# Patient Record
Sex: MALE | Race: WHITE | NOT HISPANIC OR LATINO | URBAN - METROPOLITAN AREA
[De-identification: names, ages, dates, MRNs, and addresses within clinical notes are randomized per-mention and may not be internally consistent; named-entity substitution may affect disease eponyms.]

---

## 2023-08-22 ENCOUNTER — HOSPITAL ENCOUNTER (OUTPATIENT)
Dept: DATA CONVERSION | Facility: HOSPITAL | Age: 48
Discharge: HOME | End: 2023-08-22

## 2023-08-22 DIAGNOSIS — R73.09 OTHER ABNORMAL GLUCOSE: ICD-10-CM

## 2023-08-22 DIAGNOSIS — Z00.00 ENCOUNTER FOR GENERAL ADULT MEDICAL EXAMINATION WITHOUT ABNORMAL FINDINGS: ICD-10-CM

## 2023-08-22 LAB
ALBUMIN SERPL-MCNC: 4.5 GM/DL (ref 3.5–5)
ALBUMIN/GLOB SERPL: 1.8 RATIO (ref 1.5–3)
ALP BLD-CCNC: 75 U/L (ref 35–125)
ALT SERPL-CCNC: 31 U/L (ref 5–40)
ANION GAP SERPL CALCULATED.3IONS-SCNC: 10 MMOL/L (ref 0–19)
APPEARANCE PLAS: NORMAL
AST SERPL-CCNC: 30 U/L (ref 5–40)
BACTERIA UR QL AUTO: NEGATIVE
BASOPHILS # BLD AUTO: 0.06 K/UL (ref 0–0.22)
BASOPHILS NFR BLD AUTO: 0.8 % (ref 0–1)
BILIRUB SERPL-MCNC: 0.6 MG/DL (ref 0.1–1.2)
BILIRUB UR QL STRIP.AUTO: NEGATIVE
BUN SERPL-MCNC: 15 MG/DL (ref 8–25)
BUN/CREAT SERPL: 13.6 RATIO (ref 8–21)
CALCIUM SERPL-MCNC: 9.4 MG/DL (ref 8.5–10.4)
CHLORIDE SERPL-SCNC: 103 MMOL/L (ref 97–107)
CHOLEST SERPL-MCNC: 198 MG/DL (ref 133–200)
CHOLEST/HDLC SERPL: 2.5 RATIO
CLARITY UR: CLEAR
CO2 SERPL-SCNC: 26 MMOL/L (ref 24–31)
COLOR SPUN FLD: NORMAL
COLOR UR: YELLOW
CREAT SERPL-MCNC: 1.1 MG/DL (ref 0.4–1.6)
DEPRECATED RDW RBC AUTO: 42.9 FL (ref 37–54)
DIFFERENTIAL METHOD BLD: ABNORMAL
EOSINOPHIL # BLD AUTO: 0.08 K/UL (ref 0–0.45)
EOSINOPHIL NFR BLD: 1 % (ref 0–3)
ERYTHROCYTE [DISTWIDTH] IN BLOOD BY AUTOMATED COUNT: 11.7 % (ref 11.7–15)
FASTING STATUS PATIENT QL REPORTED: NORMAL
GFR SERPL CREATININE-BSD FRML MDRD: 83 ML/MIN/1.73 M2
GLOBULIN SER-MCNC: 2.5 G/DL (ref 1.9–3.7)
GLUCOSE SERPL-MCNC: 105 MG/DL (ref 65–99)
GLUCOSE UR STRIP.AUTO-MCNC: NEGATIVE MG/DL
HBA1C MFR BLD: 5.5 % (ref 4–6)
HCT VFR BLD AUTO: 46 % (ref 41–50)
HDLC SERPL-MCNC: 80 MG/DL
HGB BLD-MCNC: 15.2 GM/DL (ref 13.5–16.5)
HGB UR QL STRIP.AUTO: ABNORMAL /HPF (ref 0–3)
HGB UR QL: NEGATIVE
HYALINE CASTS UR QL AUTO: 3 /LPF
IMM GRANULOCYTES # BLD AUTO: 0.02 K/UL (ref 0–0.1)
KETONES UR QL STRIP.AUTO: NEGATIVE
LDLC SERPL CALC-MCNC: 98 MG/DL (ref 65–130)
LEUKOCYTE ESTERASE UR QL STRIP.AUTO: NEGATIVE
LYMPHOCYTES # BLD AUTO: 1.89 K/UL (ref 1.2–3.2)
LYMPHOCYTES NFR BLD MANUAL: 24.3 % (ref 20–40)
MCH RBC QN AUTO: 33 PG (ref 26–34)
MCHC RBC AUTO-ENTMCNC: 33 % (ref 31–37)
MCV RBC AUTO: 100 FL (ref 80–100)
MONOCYTES # BLD AUTO: 0.7 K/UL (ref 0–0.8)
MONOCYTES NFR BLD MANUAL: 9 % (ref 0–8)
NEUTROPHILS # BLD AUTO: 5.03 K/UL
NEUTROPHILS # BLD AUTO: 5.03 K/UL (ref 1.8–7.7)
NEUTROPHILS.IMMATURE NFR BLD: 0.3 % (ref 0–1)
NEUTS SEG NFR BLD: 64.6 % (ref 50–70)
NITRITE UR QL STRIP.AUTO: NEGATIVE
NRBC BLD-RTO: 0 /100 WBC
PH UR STRIP.AUTO: 6.5 [PH] (ref 4.6–8)
PLATELET # BLD AUTO: 215 K/UL (ref 150–450)
PMV BLD AUTO: 12.3 CU (ref 7–12.6)
POTASSIUM SERPL-SCNC: 4.5 MMOL/L (ref 3.4–5.1)
PROT SERPL-MCNC: 7 G/DL (ref 5.9–7.9)
PROT UR STRIP.AUTO-MCNC: ABNORMAL MG/DL
RBC # BLD AUTO: 4.6 M/UL (ref 4.5–5.5)
REFLEX MICROSCOPIC (UA): ABNORMAL
SODIUM SERPL-SCNC: 139 MMOL/L (ref 133–145)
SP GR UR STRIP.AUTO: 1.02 (ref 1–1.03)
SQUAMOUS UR QL AUTO: ABNORMAL /HPF
TRIGL SERPL-MCNC: 100 MG/DL (ref 40–150)
UROBILINOGEN UR QL STRIP.AUTO: NORMAL MG/DL (ref 0–1)
WBC # BLD AUTO: 7.8 K/UL (ref 4.5–11)
WBC #/AREA URNS AUTO: 2 /HPF (ref 0–3)

## 2024-04-04 ENCOUNTER — TELEPHONE (OUTPATIENT)
Dept: PRIMARY CARE | Facility: CLINIC | Age: 49
End: 2024-04-04
Payer: COMMERCIAL

## 2024-04-04 ENCOUNTER — APPOINTMENT (OUTPATIENT)
Dept: RADIOLOGY | Facility: HOSPITAL | Age: 49
End: 2024-04-04
Payer: COMMERCIAL

## 2024-04-04 ENCOUNTER — HOSPITAL ENCOUNTER (EMERGENCY)
Facility: HOSPITAL | Age: 49
Discharge: HOME | End: 2024-04-04
Payer: COMMERCIAL

## 2024-04-04 ENCOUNTER — APPOINTMENT (OUTPATIENT)
Dept: CARDIOLOGY | Facility: HOSPITAL | Age: 49
End: 2024-04-04
Payer: COMMERCIAL

## 2024-04-04 VITALS
OXYGEN SATURATION: 97 % | DIASTOLIC BLOOD PRESSURE: 83 MMHG | WEIGHT: 246.03 LBS | TEMPERATURE: 97.7 F | BODY MASS INDEX: 30.59 KG/M2 | HEART RATE: 69 BPM | HEIGHT: 75 IN | SYSTOLIC BLOOD PRESSURE: 130 MMHG | RESPIRATION RATE: 17 BRPM

## 2024-04-04 DIAGNOSIS — R07.9 CHEST PAIN, UNSPECIFIED TYPE: Primary | ICD-10-CM

## 2024-04-04 LAB
ALBUMIN SERPL-MCNC: 4.4 G/DL (ref 3.5–5)
ALP BLD-CCNC: 82 U/L (ref 35–125)
ALT SERPL-CCNC: 36 U/L (ref 5–40)
ANION GAP SERPL CALC-SCNC: 10 MMOL/L
AST SERPL-CCNC: 24 U/L (ref 5–40)
BASOPHILS # BLD AUTO: 0.04 X10*3/UL (ref 0–0.1)
BASOPHILS NFR BLD AUTO: 0.4 %
BILIRUB SERPL-MCNC: 0.4 MG/DL (ref 0.1–1.2)
BUN SERPL-MCNC: 11 MG/DL (ref 8–25)
CALCIUM SERPL-MCNC: 9.3 MG/DL (ref 8.5–10.4)
CHLORIDE SERPL-SCNC: 103 MMOL/L (ref 97–107)
CO2 SERPL-SCNC: 28 MMOL/L (ref 24–31)
CREAT SERPL-MCNC: 1 MG/DL (ref 0.4–1.6)
EGFRCR SERPLBLD CKD-EPI 2021: >90 ML/MIN/1.73M*2
EOSINOPHIL # BLD AUTO: 0.08 X10*3/UL (ref 0–0.7)
EOSINOPHIL NFR BLD AUTO: 0.8 %
ERYTHROCYTE [DISTWIDTH] IN BLOOD BY AUTOMATED COUNT: 11.7 % (ref 11.5–14.5)
GLUCOSE SERPL-MCNC: 158 MG/DL (ref 65–99)
HCT VFR BLD AUTO: 42.1 % (ref 41–52)
HGB BLD-MCNC: 14.7 G/DL (ref 13.5–17.5)
IMM GRANULOCYTES # BLD AUTO: 0.02 X10*3/UL (ref 0–0.7)
IMM GRANULOCYTES NFR BLD AUTO: 0.2 % (ref 0–0.9)
LYMPHOCYTES # BLD AUTO: 2.26 X10*3/UL (ref 1.2–4.8)
LYMPHOCYTES NFR BLD AUTO: 22.2 %
MCH RBC QN AUTO: 33.9 PG (ref 26–34)
MCHC RBC AUTO-ENTMCNC: 34.9 G/DL (ref 32–36)
MCV RBC AUTO: 97 FL (ref 80–100)
MONOCYTES # BLD AUTO: 0.63 X10*3/UL (ref 0.1–1)
MONOCYTES NFR BLD AUTO: 6.2 %
NEUTROPHILS # BLD AUTO: 7.16 X10*3/UL (ref 1.2–7.7)
NEUTROPHILS NFR BLD AUTO: 70.2 %
NRBC BLD-RTO: ABNORMAL /100{WBCS}
PLATELET # BLD AUTO: 211 X10*3/UL (ref 150–450)
POTASSIUM SERPL-SCNC: 4.6 MMOL/L (ref 3.4–5.1)
PROT SERPL-MCNC: 6.9 G/DL (ref 5.9–7.9)
RBC # BLD AUTO: 4.33 X10*6/UL (ref 4.5–5.9)
SODIUM SERPL-SCNC: 141 MMOL/L (ref 133–145)
TROPONIN T SERPL-MCNC: 6 NG/L
TROPONIN T SERPL-MCNC: <6 NG/L
WBC # BLD AUTO: 10.2 X10*3/UL (ref 4.4–11.3)

## 2024-04-04 PROCEDURE — 2550000001 HC RX 255 CONTRASTS

## 2024-04-04 PROCEDURE — 36415 COLL VENOUS BLD VENIPUNCTURE: CPT | Performed by: EMERGENCY MEDICINE

## 2024-04-04 PROCEDURE — 71260 CT THORAX DX C+: CPT | Performed by: RADIOLOGY

## 2024-04-04 PROCEDURE — 71275 CT ANGIOGRAPHY CHEST: CPT

## 2024-04-04 PROCEDURE — 80053 COMPREHEN METABOLIC PANEL: CPT | Performed by: EMERGENCY MEDICINE

## 2024-04-04 PROCEDURE — 85025 COMPLETE CBC W/AUTO DIFF WBC: CPT | Performed by: EMERGENCY MEDICINE

## 2024-04-04 PROCEDURE — 93005 ELECTROCARDIOGRAM TRACING: CPT

## 2024-04-04 PROCEDURE — 99285 EMERGENCY DEPT VISIT HI MDM: CPT | Mod: 25

## 2024-04-04 PROCEDURE — 84484 ASSAY OF TROPONIN QUANT: CPT | Performed by: EMERGENCY MEDICINE

## 2024-04-04 RX ORDER — IBUPROFEN 800 MG/1
800 TABLET ORAL 3 TIMES DAILY
Qty: 21 TABLET | Refills: 0 | Status: SHIPPED | OUTPATIENT
Start: 2024-04-04 | End: 2024-04-11

## 2024-04-04 RX ORDER — ASPIRIN 81 MG/1
81 TABLET ORAL DAILY
COMMUNITY

## 2024-04-04 RX ADMIN — IOHEXOL 75 ML: 350 INJECTION, SOLUTION INTRAVENOUS at 16:27

## 2024-04-04 ASSESSMENT — PAIN DESCRIPTION - ONSET: ONSET: SUDDEN

## 2024-04-04 ASSESSMENT — PAIN DESCRIPTION - DESCRIPTORS
DESCRIPTORS: DULL
DESCRIPTORS: DULL

## 2024-04-04 ASSESSMENT — PAIN SCALES - GENERAL
PAINLEVEL_OUTOF10: 2
PAINLEVEL_OUTOF10: 3

## 2024-04-04 ASSESSMENT — PAIN DESCRIPTION - LOCATION: LOCATION: CHEST

## 2024-04-04 ASSESSMENT — PAIN DESCRIPTION - FREQUENCY: FREQUENCY: CONSTANT/CONTINUOUS

## 2024-04-04 ASSESSMENT — COLUMBIA-SUICIDE SEVERITY RATING SCALE - C-SSRS
1. IN THE PAST MONTH, HAVE YOU WISHED YOU WERE DEAD OR WISHED YOU COULD GO TO SLEEP AND NOT WAKE UP?: NO
6. HAVE YOU EVER DONE ANYTHING, STARTED TO DO ANYTHING, OR PREPARED TO DO ANYTHING TO END YOUR LIFE?: NO
2. HAVE YOU ACTUALLY HAD ANY THOUGHTS OF KILLING YOURSELF?: NO

## 2024-04-04 ASSESSMENT — PAIN DESCRIPTION - PAIN TYPE: TYPE: ACUTE PAIN

## 2024-04-04 ASSESSMENT — PAIN - FUNCTIONAL ASSESSMENT: PAIN_FUNCTIONAL_ASSESSMENT: 0-10

## 2024-04-04 ASSESSMENT — PAIN DESCRIPTION - ORIENTATION: ORIENTATION: LEFT;MID

## 2024-04-04 ASSESSMENT — PAIN DESCRIPTION - PROGRESSION: CLINICAL_PROGRESSION: NOT CHANGED

## 2024-04-04 NOTE — TELEPHONE ENCOUNTER
Patient called  [phone] said for past couple days  he has had dull pain left side of chest has today and right thumb numbness  for couple weeks no other SX could not be triaged.  Patient had hung up 2 x  , called back left message

## 2024-04-04 NOTE — ED PROVIDER NOTES
HPI   Chief Complaint   Patient presents with    Chest Pain     For the past 2 days I have had dull pain in my lt chest close to mid sternal I have had a aortic dissection which is slow in 2019 it was 4.2 cm no pain in my back       HPI  Patient is a 48-year-old male with history of thoracic aortic aneurysm who presents to ED for dull achy left-sided chest pain x 2 days.  Patient states he contacted primary care provider who advised him to come to ED for evaluation and obtain CT scan for reevaluation of of aortic aneurysm as well as ACS.  Patient denies any shortness of breath, abdominal pain, NVD, urinary symptoms, fever or chills, headache or dizziness.  Patient is well-appearing, he states he feels fine other than this dull ache on the left side of his chest which he thinks may be due to swinging a golf club recently.  Patient has no other acute complaints today.                  [place] Coma Scale Score: 15                     Patient History   No past medical history on file.  No past surgical history on file.  No family history on file.  Social History     Tobacco Use    Smoking status: Not on file    Smokeless tobacco: Not on file   Substance Use Topics    Alcohol use: Not on file    Drug use: Not on file       Physical Exam   ED Triage Vitals [04/04/24 1457]   Temperature Heart Rate Respirations BP   36.5 °C (97.7 °F) 86 18 153/59      Pulse Ox Temp Source Heart Rate Source Patient Position   99 % Oral Monitor Sitting      BP Location FiO2 (%)     Right arm --       Physical Exam  Vitals reviewed.   Constitutional:       Appearance: He is well-developed.   HENT:      Head: Normocephalic and atraumatic.   Eyes:      Extraocular Movements: Extraocular movements intact.   Cardiovascular:      Rate and Rhythm: Normal rate and regular rhythm.   Pulmonary:      Effort: Pulmonary effort is normal.      Breath sounds: Normal breath sounds.   Abdominal:      General: Abdomen is flat.      Palpations: Abdomen is soft.       Tenderness: There is no abdominal tenderness.   Musculoskeletal:         General: Normal range of motion.      Cervical back: Normal range of motion and neck supple.   Skin:     General: Skin is warm and dry.   Neurological:      General: No focal deficit present.      Mental Status: He is alert and oriented to person, place, and time.   Psychiatric:         Mood and Affect: Mood normal.         Behavior: Behavior normal.         ED Course & MDM   ED Course as of 04/04/24 1907   Thu Apr 04, 2024   1550 Normal sinus rhythm with a rate of 85.  No evidence of ischemia no previous EKG for comparison [JN]      ED Course User Index  [JN] William Short MD         Diagnoses as of 04/04/24 1907   Chest pain, unspecified type       Medical Decision Making  Parts of this chart have been completed using voice recognition software. Please excuse any errors of transcription.  My thought process and reason for plan has been formulated from the time that I saw the patient until the time of disposition and is not specific to one specific moment during their visit and furthermore my MDM encompasses this entire chart and not only this text box.    HPI:   Detailed above.    Exam:   A medically appropriate exam performed, outlined above, given the known history and presentation.    History obtained from:   Patient, EMR    EKG:   Interpreted by attending physician, see their note for ED course for more detail.    Social Determinants of Health considered during this visit:   Employment status, Family or social support .    Medications given during visit:  Medications   iohexol (OMNIPaque) 350 mg iodine/mL solution 75 mL (75 mL intravenous Given 4/4/24 1627)        Diagnostic/tests:  Labs Reviewed   CBC WITH AUTO DIFFERENTIAL - Abnormal       Result Value    WBC 10.2      nRBC        RBC 4.33 (*)     Hemoglobin 14.7      Hematocrit 42.1      MCV 97      MCH 33.9      MCHC 34.9      RDW 11.7      Platelets 211       Neutrophils % 70.2      Immature Granulocytes %, Automated 0.2      Lymphocytes % 22.2      Monocytes % 6.2      Eosinophils % 0.8      Basophils % 0.4      Neutrophils Absolute 7.16      Immature Granulocytes Absolute, Automated 0.02      Lymphocytes Absolute 2.26      Monocytes Absolute 0.63      Eosinophils Absolute 0.08      Basophils Absolute 0.04     COMPREHENSIVE METABOLIC PANEL - Abnormal    Glucose 158 (*)     Sodium 141      Potassium 4.6      Chloride 103      Bicarbonate 28      Urea Nitrogen 11      Creatinine 1.00      eGFR >90      Calcium 9.3      Albumin 4.4      Alkaline Phosphatase 82      Total Protein 6.9      AST 24      Bilirubin, Total 0.4      ALT 36      Anion Gap 10     SERIAL TROPONIN, INITIAL ([facility]) - Normal    Troponin T, High Sensitivity 6     SERIAL TROPONIN,  2 HOUR ([facility]) - Normal    Troponin T, High Sensitivity <6     TROPONIN T SERIES, HIGH SENSITIVITY (0, 2 HR, 6 HR)    Narrative:     The following orders were created for panel order Troponin T Series, High Sensitivity (0, 2HR, 6HR).  Procedure                               Abnormality         Status                     ---------                               -----------         ------                     Serial Troponin, Initial...[[id number]]  Normal              Final result               Serial Troponin, 2 Hour ...[[id number]]  Normal              Final result               Serial Troponin, 6 Hour ...[[id number]]                                                   Please view results for these tests on the individual orders.   SERIAL TROPONIN, 6 HOUR ([facility])      CT angio chest w and wo IV contrast   Final Result   Ectasia of ascending thoracic aorta to 4 cm, stable compared to   October 2021.        MACRO:   None        Signed by: Delonte Holley 4/4/2024 5:27 PM   Dictation workstation:   [id number]           Differential Diagnosis:   As I have deemed necessary from their history, physical, and studies, I have considered the  following diagnoses:     ACUTE MYOCARDIAL INFARCTION  PULMONARY EMBOLISM  AORTIC DISSECTION  PERICARDITIS  PNEUMOTHORAX  PNEUMONIA  COSTOCHONDRITIS OR MSK PAIN  PERFORATED PEPTIC ULCER  ESOPHAGEAL RUPTURE  GERD  CHOLECYSTITIS    HEART Score <=3 and 1 negative troponin - No hospitalization indicated  I completed a HEART Score to screen for Major Adverse Cardiac Event (MACE) in this patient. The evidence indicates that the patient is low risk for MACE and this is consistent with my clinical intuition. The risk of further workup or hospitalization for MACE is likely higher than the risk of the patient having a MACE. It is,  therefore, in the patient´s best interest not to do additional emergent testing or to be hospitalized for MACE. I have discussed with the patient my clinical impression and the result of the HEART Score to screen for MACE, as well as the risks of further testing and hospitalization. The HEART Score shows that the risk for MACE is less than 2%    Consultations:      Procedures:      Critical Care:      Referrals:      Discharge Prescriptions:  Ibuprofen    MDM Summary:  Chest pain is likely musculoskeletal.  Workup is unremarkable.  Troponins are within normal limits.  Vital signs are all within normal limits and stable.  Patient is very well-appearing and pleasant in conversation.  CT of chest shows aneurysm is stable and has not grown in size since prior CT 2 years ago.  Patient is agreeable to discharge home at this time with appropriate follow-up with primary provider.  We have discussed the diagnosis and risks, and we agree with discharging home to follow-up with appropriate physician as directed. We also discussed returning to the Emergency Department immediately if new or worsening symptoms occur. We have discussed the symptoms which are most concerning that necessitate immediate return. Pt symptoms have been well controlled here and the patient is safe for discharge with appropriate  outpatient follow up. The patient has verbalized understanding to return to ER without delay for new or worsening pains or for any other symptoms or concerns.    I utilized the discharge clinical management tool provided [employer] to help estimate risk of negative outcome for this patient.        Procedure  Procedures     Heber Manley PA-C  04/04/24 1916

## 2024-04-04 NOTE — PROGRESS NOTES
Pharmacy Medication History Review    Freddy Olson is a 48 y.o. male admitted for chest pain. Pharmacy reviewed the patient's [id number] medications and allergies for accuracy.    The list below reflectives the updated PTA list. Please review each medication in order reconciliation for additional clarification and justification.  Prior to Admission Medications   Prescriptions Last Dose Informant Patient Reported? Taking?   aspirin 81 mg EC tablet More than a month  Yes No   Sig: Take 1 tablet (81 mg) by mouth once daily.      Facility-Administered Medications: None          The list below reflectives the updated allergy list. Please review each documented allergy for additional clarification and justification.  Allergies  Reviewed by Cyn Kwon CPhT on 4/4/2024   No Known Allergies         Below are additional concerns with the patient's PTA list.  - pt stated he takes aspirin 81 mg when he knows he will be seated for a while, such as af light, long drive, said he needs to be better about taking it.     CYN KWON CPhT

## 2024-06-19 LAB
ATRIAL RATE: 85 BPM
P AXIS: 72 DEGREES
P OFFSET: 204 MS
P ONSET: 150 MS
PR INTERVAL: 138 MS
Q ONSET: 219 MS
QRS COUNT: 14 BEATS
QRS DURATION: 88 MS
QT INTERVAL: 356 MS
QTC CALCULATION(BAZETT): 423 MS
QTC FREDERICIA: 399 MS
R AXIS: 74 DEGREES
T AXIS: 71 DEGREES
T OFFSET: 397 MS
VENTRICULAR RATE: 85 BPM

## 2024-07-26 ENCOUNTER — TELEPHONE (OUTPATIENT)
Dept: PRIMARY CARE | Facility: CLINIC | Age: 49
End: 2024-07-26
Payer: COMMERCIAL

## 2024-07-26 NOTE — TELEPHONE ENCOUNTER
Patient called in [phone]  Has had some bloating and tenderness and soreness on his lower left abdomin for about 3 weeks. He thought he pulled a muscle while golfing, but it hasn't gotten better. He is looking for advice.

## 2024-08-05 ENCOUNTER — APPOINTMENT (OUTPATIENT)
Dept: PRIMARY CARE | Facility: CLINIC | Age: 49
End: 2024-08-05
Payer: COMMERCIAL

## 2024-08-05 VITALS
RESPIRATION RATE: 18 BRPM | SYSTOLIC BLOOD PRESSURE: 130 MMHG | BODY MASS INDEX: 29.62 KG/M2 | OXYGEN SATURATION: 97 % | DIASTOLIC BLOOD PRESSURE: 82 MMHG | TEMPERATURE: 98.3 F | WEIGHT: 237 LBS | HEART RATE: 69 BPM

## 2024-08-05 DIAGNOSIS — R10.32 LEFT LOWER QUADRANT ABDOMINAL PAIN: ICD-10-CM

## 2024-08-05 DIAGNOSIS — M94.0 COSTOCHONDRITIS: Primary | ICD-10-CM

## 2024-08-05 LAB
POC APPEARANCE, URINE: CLEAR
POC BILIRUBIN, URINE: NEGATIVE
POC BLOOD, URINE: NEGATIVE
POC COLOR, URINE: YELLOW
POC GLUCOSE, URINE: NEGATIVE MG/DL
POC KETONES, URINE: NEGATIVE MG/DL
POC LEUKOCYTES, URINE: NEGATIVE
POC NITRITE,URINE: NEGATIVE
POC PH, URINE: 6 PH
POC PROTEIN, URINE: NEGATIVE MG/DL
POC SPECIFIC GRAVITY, URINE: >=1.03
POC UROBILINOGEN, URINE: 0.2 EU/DL

## 2024-08-05 PROCEDURE — 81003 URINALYSIS AUTO W/O SCOPE: CPT | Performed by: FAMILY MEDICINE

## 2024-08-05 PROCEDURE — 99213 OFFICE O/P EST LOW 20 MIN: CPT | Performed by: FAMILY MEDICINE

## 2024-08-05 PROCEDURE — 4004F PT TOBACCO SCREEN RCVD TLK: CPT | Performed by: FAMILY MEDICINE

## 2024-08-05 ASSESSMENT — PAIN SCALES - GENERAL: PAINLEVEL: 1

## 2024-08-05 NOTE — ASSESSMENT & PLAN NOTE
I suspect he strained his rib or Xiang rib cartilage.  Recommended ibuprofen 6 or milligrams twice daily for 10 days scheduled.  Heat 20 as per application.  Light stretching exercises.  I did tell him that if he had Recurrence of pain when active such as swinging a golf club may prolong his symptoms.  If overall the pain is becoming worse or he has other symptoms associated with that he should return to see me.

## 2024-08-05 NOTE — PROGRESS NOTES
Subjective   Patient ID: Asael Olson is a 48 y.o. male who presents for Abdominal Pain (Patient is here for left abd pain on the side x 1 month ).    HPI   He is here for left flank pain.  Started about a month ago.  He first noticed it when he was straining to have a bowel movement.  Ever since then he has intermittent pain when he moves in a certain position.  He is noticed it several times when he swings a golf club but not every time he is on the golf club.  He has taken ibuprofen on a couple occasions.  Most time it is not too painful.  He has had no significant change in bowel movements and no nausea and no shortness of breath.  Review of Systems  Denies headache, blurred vision, chest pain, shortness of breath, nausea or vomiting, change in bowel habits or leg pain or swelling.    Objective   /82 (BP Location: Left arm, Patient Position: Sitting, BP Cuff Size: Large adult)   Pulse 69   Temp 36.8 °C (98.3 °F)   Resp 18   Wt 108 kg (237 lb)   SpO2 97%   BMI 29.62 kg/m²     Physical Exam  Vitals and nurse's notes reviewed  General: no acute distress  HEENT: Normal  Neck: Supple  Lungs: Clear  Cardio: RRR w/o murmur  Abdomen: Soft, nontender, no hepatosplenomegaly. There is pinpoint tenderness along the lower rib margin on the left lateral aspect.  There is no bruising here.  The pain was reproduced when I pushed in this area.  Extremities: No edema, no calf tenderness  Neuro: Alert and oriented with no focal deficits    Assessment/Plan   Problem List Items Addressed This Visit             ICD-10-CM       Medium    Costochondritis - Primary M94.0     I suspect he strained his rib or Xiang rib cartilage.  Recommended ibuprofen 6 or milligrams twice daily for 10 days scheduled.  Heat 20 as per application.  Light stretching exercises.  I did tell him that if he had Recurrence of pain when active such as swinging a golf club may prolong his symptoms.  If overall the pain is becoming worse or he has other  symptoms associated with that he should return to see me.          Other Visit Diagnoses         Codes    Left lower quadrant abdominal pain     R10.32    Relevant Orders    POCT UA Automated manually resulted (Completed)

## 2024-08-17 ENCOUNTER — HOSPITAL ENCOUNTER (EMERGENCY)
Facility: HOSPITAL | Age: 49
Discharge: OTHER INSTITUTION | End: 2024-08-18
Attending: EMERGENCY MEDICINE
Payer: COMMERCIAL

## 2024-08-17 VITALS
SYSTOLIC BLOOD PRESSURE: 142 MMHG | WEIGHT: 235 LBS | DIASTOLIC BLOOD PRESSURE: 91 MMHG | BODY MASS INDEX: 29.22 KG/M2 | HEIGHT: 75 IN | RESPIRATION RATE: 16 BRPM | OXYGEN SATURATION: 97 % | TEMPERATURE: 98.6 F | HEART RATE: 67 BPM

## 2024-08-17 DIAGNOSIS — B00.50: Primary | ICD-10-CM

## 2024-08-17 DIAGNOSIS — B00.1: Primary | ICD-10-CM

## 2024-08-17 PROCEDURE — 99283 EMERGENCY DEPT VISIT LOW MDM: CPT

## 2024-08-17 PROCEDURE — 2500000001 HC RX 250 WO HCPCS SELF ADMINISTERED DRUGS (ALT 637 FOR MEDICARE OP): Performed by: EMERGENCY MEDICINE

## 2024-08-17 RX ORDER — VALACYCLOVIR HYDROCHLORIDE 500 MG/1
1000 TABLET, FILM COATED ORAL ONCE
Status: COMPLETED | OUTPATIENT
Start: 2024-08-18 | End: 2024-08-17

## 2024-08-17 RX ORDER — TETRACAINE HYDROCHLORIDE 5 MG/ML
1 SOLUTION OPHTHALMIC ONCE
Status: COMPLETED | OUTPATIENT
Start: 2024-08-17 | End: 2024-08-17

## 2024-08-17 RX ADMIN — VALACYCLOVIR HYDROCHLORIDE 1000 MG: 500 TABLET, FILM COATED ORAL at 23:58

## 2024-08-17 RX ADMIN — FLUORESCEIN SODIUM 1 STRIP: 1 STRIP OPHTHALMIC at 23:30

## 2024-08-17 RX ADMIN — TETRACAINE HYDROCHLORIDE 1 DROP: 5 SOLUTION OPHTHALMIC at 23:30

## 2024-08-17 ASSESSMENT — COLUMBIA-SUICIDE SEVERITY RATING SCALE - C-SSRS
2. HAVE YOU ACTUALLY HAD ANY THOUGHTS OF KILLING YOURSELF?: NO
1. IN THE PAST MONTH, HAVE YOU WISHED YOU WERE DEAD OR WISHED YOU COULD GO TO SLEEP AND NOT WAKE UP?: NO
6. HAVE YOU EVER DONE ANYTHING, STARTED TO DO ANYTHING, OR PREPARED TO DO ANYTHING TO END YOUR LIFE?: NO

## 2024-08-17 ASSESSMENT — PAIN - FUNCTIONAL ASSESSMENT: PAIN_FUNCTIONAL_ASSESSMENT: 0-10

## 2024-08-17 ASSESSMENT — PAIN SCALES - GENERAL: PAINLEVEL_OUTOF10: 2

## 2024-08-18 VITALS
BODY MASS INDEX: 29.33 KG/M2 | DIASTOLIC BLOOD PRESSURE: 94 MMHG | RESPIRATION RATE: 16 BRPM | WEIGHT: 235.89 LBS | OXYGEN SATURATION: 99 % | HEART RATE: 61 BPM | TEMPERATURE: 96.6 F | HEIGHT: 75 IN | SYSTOLIC BLOOD PRESSURE: 157 MMHG

## 2024-08-18 DIAGNOSIS — B02.30 HERPES ZOSTER OPHTHALMICUS OF RIGHT EYE: Primary | ICD-10-CM

## 2024-08-18 PROCEDURE — 2500000002 HC RX 250 W HCPCS SELF ADMINISTERED DRUGS (ALT 637 FOR MEDICARE OP, ALT 636 FOR OP/ED): Performed by: EMERGENCY MEDICINE

## 2024-08-18 PROCEDURE — 99284 EMERGENCY DEPT VISIT MOD MDM: CPT | Performed by: PHYSICIAN ASSISTANT

## 2024-08-18 PROCEDURE — 99283 EMERGENCY DEPT VISIT LOW MDM: CPT

## 2024-08-18 PROCEDURE — 2500000001 HC RX 250 WO HCPCS SELF ADMINISTERED DRUGS (ALT 637 FOR MEDICARE OP): Performed by: PHYSICIAN ASSISTANT

## 2024-08-18 PROCEDURE — 99284 EMERGENCY DEPT VISIT MOD MDM: CPT

## 2024-08-18 RX ORDER — VALACYCLOVIR HYDROCHLORIDE 1 G/1
1000 TABLET, FILM COATED ORAL 3 TIMES DAILY
Qty: 30 TABLET | Refills: 0 | Status: SHIPPED | OUTPATIENT
Start: 2024-08-18 | End: 2024-08-28

## 2024-08-18 RX ORDER — OXYCODONE AND ACETAMINOPHEN 5; 325 MG/1; MG/1
1 TABLET ORAL ONCE
Status: COMPLETED | OUTPATIENT
Start: 2024-08-18 | End: 2024-08-18

## 2024-08-18 RX ORDER — MOXIFLOXACIN 5 MG/ML
1 SOLUTION/ DROPS OPHTHALMIC 3 TIMES DAILY
Qty: 3 ML | Refills: 0 | Status: SHIPPED | OUTPATIENT
Start: 2024-08-18 | End: 2024-08-25

## 2024-08-18 RX ORDER — ERYTHROMYCIN 5 MG/G
OINTMENT OPHTHALMIC NIGHTLY
Qty: 3.5 G | Refills: 0 | Status: SHIPPED | OUTPATIENT
Start: 2024-08-18 | End: 2024-08-28

## 2024-08-18 RX ORDER — KETOROLAC TROMETHAMINE 30 MG/ML
30 INJECTION, SOLUTION INTRAMUSCULAR; INTRAVENOUS ONCE
Status: DISCONTINUED | OUTPATIENT
Start: 2024-08-18 | End: 2024-08-18

## 2024-08-18 RX ORDER — OXYCODONE AND ACETAMINOPHEN 5; 325 MG/1; MG/1
1 TABLET ORAL ONCE
Status: DISCONTINUED | OUTPATIENT
Start: 2024-08-18 | End: 2024-08-18

## 2024-08-18 ASSESSMENT — LIFESTYLE VARIABLES
TOTAL SCORE: 0
EVER HAD A DRINK FIRST THING IN THE MORNING TO STEADY YOUR NERVES TO GET RID OF A HANGOVER: NO
EVER FELT BAD OR GUILTY ABOUT YOUR DRINKING: NO
HAVE PEOPLE ANNOYED YOU BY CRITICIZING YOUR DRINKING: NO
HAVE YOU EVER FELT YOU SHOULD CUT DOWN ON YOUR DRINKING: NO

## 2024-08-18 ASSESSMENT — PAIN SCALES - GENERAL: PAINLEVEL_OUTOF10: 3

## 2024-08-18 ASSESSMENT — COLUMBIA-SUICIDE SEVERITY RATING SCALE - C-SSRS
6. HAVE YOU EVER DONE ANYTHING, STARTED TO DO ANYTHING, OR PREPARED TO DO ANYTHING TO END YOUR LIFE?: NO
1. IN THE PAST MONTH, HAVE YOU WISHED YOU WERE DEAD OR WISHED YOU COULD GO TO SLEEP AND NOT WAKE UP?: NO
2. HAVE YOU ACTUALLY HAD ANY THOUGHTS OF KILLING YOURSELF?: NO

## 2024-08-18 ASSESSMENT — PAIN DESCRIPTION - LOCATION: LOCATION: EYE

## 2024-08-18 NOTE — ED TRIAGE NOTES
Pt coming in for optho consult sent from [facility] pt eye is red and swollen there was a concern for shingles to right eye pt able to see out his right eye but it is blurry

## 2024-08-18 NOTE — ED PROVIDER NOTES
HPI   Chief Complaint   Patient presents with    Eye Problem       HPI: Patient is a 48-year-old male who presents to the ED for concern of herpes shingles ophthalmicus.  Patient initially seen at [facility] for complaint of right eye pain.  States that it started off with his right eye feeling itchy and irritated.  States that since then he started noticing lesions on his right forehead and right temporal area.  States that the eye has been progressively more painful.  He was sent to the ED for consult ophthalmology consult and concern of involvement of the eye.  Patient currently states that his pain is rated a 3 out of 10 in severity and is now starting to radiate to his right ear. He denies any blurred vision or vision loss.  Denies any photophobia.  He is a contact lens wearer.  ------------------------------------------------------------------------------------------------------------------------------------------  ROS: a ten point review of systems was performed and was negative except as per HPI.  ------------------------------------------------------------------------------------------------------------------------------------------  PMH / PSH: as per HPI, otherwise reviewed   MEDS: as per HPI, otherwise reviewed in EMR  ALLERGIES: as per HPI, otherwise reviewed in EMR  SocH:  as per HPI, otherwise reviewed in EMR  FH:  as per HPI, otherwise reviewed in EMR   ------------------------------------------------------------------------------------------------------------------------------------------  Physical Exam:  VS: As documented in the triage note and EMR flowsheet from this visit was reviewed  General: Well appearing. No acute distress.   Eyes:  Extraocular movements grossly intact. No scleral icterus. Injection of the right eye.   Head: Atraumatic. Normocephalic.     Neck: No meningismus. No gross masses. Full movement through range of motion  ENT: Posterior oropharynx shows no erythema,  exudate or edema.  Uvula is midline without edema.  No stridor or trismus  CV: Regular rhythm. No murmurs, rubs, gallops appreciated.   Resp: Clear to auscultation bilaterally. No respiratory distress.    GI: Nontender. Soft. No masses. No rebound, rigidity or guarding.   MSK: Symmetric muscle bulk. No gross step offs or deformities.  Skin: Warm, dry. Vesicular rash noted to the V1 trigeminal nerve distribution.   Neuro: CN II-VII intact. A&O x3. Speech fluent. Alert. Moving all extremities. Ambulates with normal gait  Psych: Appropriate mood and affect for situation  ------------------------------------------------------------------------------------------------------------------------------------------  Hospital Course / Medical Decision Making: Patient is a 48-year-old male who presents to the ED from a [facility] for concern of herpes shingles ophthalmicus.  Patient notes right eye irritation and itchiness followed by the vesicular rash that spread throughout his forehead and face.  On examination, has injection of the right eye with a vesicular rash noted to the V1 trigeminal nerve distribution concerning for herpes zoster ophthalmicus.  Ophthalmology consulted.  Pending their evaluation and final recommendations at the end of my shift.  Patient be signed out to the oncoming provider.  Please see their note for final results and disposition of the patient.                  Patient History   Past Medical History:   Diagnosis Date    Migraine      No past surgical history on file.  No family history on file.  Social History     Tobacco Use    Smoking status: Every Day     Current packs/day: 1.00     Types: Cigarettes     Passive exposure: Current    Smokeless tobacco: Never   Substance Use Topics    Alcohol use: Yes     Alcohol/week: 22.0 standard drinks of alcohol     Types: 22 Standard drinks or equivalent per week    Drug use: Never       Physical Exam   ED Triage Vitals [08/18/24 0110]    Temperature Heart Rate Respirations BP   35.9 °C (96.6 °F) 61 16 (!) 157/94      Pulse Ox Temp Source Heart Rate Source Patient Position   99 % Temporal -- --      BP Location FiO2 (%)     -- --       Physical Exam      ED Course & MDM   Diagnoses as of 10/23/24 2310   Herpes zoster ophthalmicus of right eye                 No data recorded     [place] Coma Scale Score: 15 (08/18/24 0122 : Verito Perez RN)                           Medical Decision Making      Procedure  Procedures     Stephanie Pascual PA-C  08/18/24 0259      This patient was seen by the [facility].  I have personally seen and examined the patient. I made / approved the management plan and take responsibility for patient management. I reviewed and edited the above documentation where necessary.     Patient presenting as transfer for ophthalmology evaluation.  They were contacted and evaluated the patient in the emergency department.    Huong Salazar DO  Emergency Medicine       Huong Salazar DO  10/23/24 4312

## 2024-08-18 NOTE — ED PROVIDER NOTES
"EMERGENCY DEPARTMENT ENCOUNTER      Pt Name: Freddy Olson \"Lissa"  MRN: [id number]  Birthdate 1975  Date of evaluation: 8/17/2024  ED Provider: Brynn Shepard DO     CHIEF COMPLAINT       Chief Complaint   Patient presents with    Eye Problem     Pt presents to the ED with c/c of RIGHT eye issues. Pt states this started on Tuesday and has been worsening. Pts eye is red and swollen. Pt states that it is painful and now he also has red bumps on the right side of his face and a dull pain in the right side of his face and head.        HISTORY OF PRESENT ILLNESS    Freddy Olson \"Lissa" is a 48 y.o. who presents to the emergency department with complaints of right eye pain.  This started on Tuesday and he notices that the eye has become progressively more painful and feels swollen.  Earlier today he started noticing lesions on his right forehead and right temporal area.  He presents now for evaluation.  He has never had anything this previously.  He states that these lesions feel tingling and itchy.  He denies any hearing changes or visual changes.  He has been wearing his glasses instead of his contacts.  No other acute complaints.     REVIEW OF SYSTEMS     Focused ROS performed and negative other than as listed in HPI    PAST MEDICAL HISTORY   No past medical history on file.    SURGICAL HISTORY     No past surgical history on file.    CURRENT MEDICATIONS       Discharge Medication List as of 8/18/2024 12:17 AM        CONTINUE these medications which have NOT CHANGED    Details   aspirin 81 mg EC tablet Take 1 tablet (81 mg) by mouth once daily., Historical Med             ALLERGIES     Patient has no known allergies.    FAMILY HISTORY     No family history on file.     SOCIAL HISTORY       Social History     Socioeconomic History    Marital status:    Tobacco Use    Smoking status: Every Day     Current packs/day: 1.00     Types: Cigarettes     Passive exposure: Current    Smokeless tobacco: Never "   Substance and Sexual Activity    Alcohol use: Yes     Alcohol/week: 22.0 standard drinks of alcohol     Types: 22 Standard drinks or equivalent per week    Drug use: Never       PHYSICAL EXAM       ED Triage Vitals [08/17/24 2324]   Temperature Heart Rate Respirations BP   37 °C (98.6 °F) 67 16 (!) 142/91      Pulse Ox Temp Source Heart Rate Source Patient Position   97 % Temporal Monitor Sitting      BP Location FiO2 (%)     Left arm --        General: Appears well, no acute distress, alert  Head: Head atraumatic; normocephalic  Eyes: no icterus; EOMI, PERRLA, O.D. -erythema to the temporal aspect of the eye, no fluorescein uptake upon testing, pupil is irregularly shaped and there are no evidence of corneal abrasions or ulcers  ENT: mucosa moist without lesion  Neck: Normal inspection, no meningeal signs  Resp: Normal breath sounds, no wheeze or crackles; No respiratory distress  Chest Wall: no tenderness or deformity  Heart: Heart rate and rhythm regular; No Murmurs  Abdomen: Soft, Non-tender; No distention, guarding, rigidity, or rebound  MSK: Normal appearance; Moves all extremities; No Pedal edema  Neuro: Alert; no focal deficits, moves all extremities  Psych: Mood and Affect normal  Skin: Color appropriate; warm; Dry; maculopapular erythematous lesions in the V1 distribution of the face on the right side    EMERGENCY DEPARTMENT COURSE/MDM   Patient presents with right eye redness and pain with new lesions that have developed over the past 24 hours.  Symptoms are highly suspicious for herpes ophthalmicus.  As the rash started within the past 72 hours will initiate antiviral therapy and provided dose here.  Given the concern for involvement of the eye will consult with ophthalmology.  Patient is agreeable with this plan of care.      ED Course as of 08/18/24 0106   Sun Aug 18, 2024   0001 Discussed case with Dr Henao, ophthalmology.  I expressed my concerns that this may represent shingles with possible  ocular involvement.  She agreed and advised the patient should be transferred downtown for further evaluation.  Report given to the emergency room.  Patient and wife are updated and agreeable for transfer by private vehicle.  All questions were answered.  Transferred in stable condition [EF]      ED Course User Index  [EF] Brynn Shepard DO         Diagnoses as of 08/18/24 0106   Herpes simplex dermatitis with ophthalmic complication       Meds Administered:  Medications   tetracaine (PF) 0.5 % ophthalmic solution 1 drop (1 drop Right Eye Given 8/17/24 2330)   fluorescein 1 mg ophthalmic strip 1 strip (1 strip Right Eye Given 8/17/24 2330)   valACYclovir (Valtrex) tablet 1,000 mg (1,000 mg oral Given 8/17/24 9525)       PROCEDURES   Unless otherwise noted below, none  Procedures      FINAL IMPRESSION      1. Herpes simplex dermatitis with ophthalmic complication          DISPOSITION    Transfer To Another Facility 08/17/2024 11:59:55 PM        (Comment: Please note this report has been produced using speech recognition software and may contain errors related to that system including errors in grammar, punctuation, and spelling, as well as words and phrases that may be inappropriate.  If there are any questions or concerns please feel free to contact the dictating provider for clarification.)    Brynn Shepard DO (electronically signed)  Emergency Medicine Physician    History Limited by: None  Independent history obtained from: Significant Other/Spouse  External records reviewed: None  Diagnostics interpreted by me: None  Discussions with other clinicians: Consultant Opthalmology  Chronic conditions impacting care: None  Social determinants of health affecting care: None  Diagnostic tests considered but not performed: n/a  ED Medications managed:  Medications   tetracaine (PF) 0.5 % ophthalmic solution 1 drop (1 drop Right Eye Given 8/17/24 2330)   fluorescein 1 mg ophthalmic strip 1 strip (1 strip Right Eye Given  8/17/24 2330)   valACYclovir (Valtrex) tablet 1,000 mg (1,000 mg oral Given 8/17/24 6973)       Prescription drugs considered: None             Brynn Shepard DO  08/18/24 0107

## 2024-08-18 NOTE — CONSULTS
Reason For Consult  Concern for ocular involvement in shingles    History Of Present Illness  Asael Olson is a 48 y.o. male with hx of thoracic aortic aneurysm presenting with right eye pain. Pt wears CL. States he slept in them Monday night and Tuesday night, and woke up Wednesday morning with eye pain and injection, but no blurry vision. Then, started to develop a rash earlier today (Sunday) along the R side of his forehead, now with involvement of his R ear. He endorses mild tearing and discharge earlier today. He does also have a headache, which he describes as right-sided sinus pressure. He denies flashes, floaters. He denies any history of immunocompromise or taking any medications which may make him immunocompromised.     Ocular hx:   - Wears CL with poor hygiene (monthly, soft contact lenses, often sleeps in them)   - Ocular migraines      Past Medical History  He has no past medical history on file.    Surgical History  He has no past surgical history on file.    Social History  He reports that he has been smoking cigarettes. He has been exposed to tobacco smoke. He has never used smokeless tobacco. He reports current alcohol use of about 22.0 standard drinks of alcohol per week. He reports that he does not use drugs.    Family History  No family history on file.     Allergies  Patient has no known allergies.    Review of Systems  See above      Physical Exam  Base Eye Exam       Visual Acuity (Snellen - Linear)         Right Left    Near cc 20/40 ph 20/20-1 20/30 ph 20/20              Tonometry (Tonopen, 3:12 AM)         Right Left    Pressure 14 11              Pupils         Dark Light Shape React APD    Right 5 3 Round Brisk None    Left 5 3 Round Brisk None              Visual Fields         Left Right     Full Full              Extraocular Movement         Right Left     Full Full                  Additional Tests       Color         Right Left    Ishihara 11/11 11/11                  Slit Lamp and  "Fundus Exam       External Exam         Right Left    External Early vesicular rash and erythema spanning R side of forehead with involvement of R ear Normal              Slit Lamp Exam         Right Left    Lids/Lashes 1+ soft edema Normal    Conjunctiva/Sclera Temporal chemosis and injection White and quiet    Cornea Tr PEE, two inferior pseudodendrites with faint staining Tr scattered PEE    Anterior Chamber Deep and quiet Deep and quiet    Iris Round and reactive Round and reactive    Lens 1+ NS Clear    Anterior Vitreous Normal Normal              Fundus Exam         Right Left    Disc Normal Normal    C/D Ratio 0.1 0.1    Macula Normal Normal    Vessels Normal Normal    Periphery Normal Normal                     Last Recorded Vitals  Blood pressure (!) 157/94, pulse 61, temperature 35.9 °C (96.6 °F), temperature source Temporal, resp. rate 16, height 1.905 m (6' 3\"), weight 107 kg (235 lb 14.3 oz), SpO2 99%.    Relevant Results         Assessment/Plan     #Herpes zoster ophthalmicus, right eye   - 47yo male with POHx of CL (poor CL hygiene) presenting with 5-day hx of R eye pain with 1-day hx of rash of R forehead with associated headache, with no vision changes.   - Exam today of the right eye notable for temporal chemosis and 1+ conjunctival injection, tr punctate epi erosions, and two faint inferior pseudodendrites.   - There is no corneal edema, anterior chamber inflammation, or involvement of the retina.   - Overall, this patient's presentation is concerning for herpes zoster ophthalmicus of the right eye with mild ocular involvement.     Recommendations:   - Valacyclovir 1000mg TID for 10 days  - Cool compresses to the eye twice a day  - Preservative free artificial tears q1h while awake, right eye  - Moxifloxacin qid, right eye  - Erythromycin anh qhs, right eye  - Erythromycin anh to skin lesions, twice a day   - Avoid contact lens use until cleared by eye doctor   - Discussed importance of good " contact lens hygiene with the patient     Patient requires follow-up within the next 3-4 days. Patient may follow with his own local optometrist if able to make an appointment within discussed time frame, otherwise may schedule with  [facility].   Preferred contact information: [phone]     Recommend follow-up with  [facility], within 3-4 days. Please call [phone] ([phone]) to make appointment.    Kylah Lechuga MD  Ophthalmology, PGY3    Ophthalmology Adult Pager - [phone]  Ophthalmology Pediatrics Pager (M-F 8:00am-5:00pm) - [phone]    For adult follow-up appointments, call: [phone]  For pediatric follow-up appointments, call: [phone]

## 2024-08-18 NOTE — PROGRESS NOTES
Emergency Department Transition of Care Note       Signout   I received Freddy Olson in signout from Stephanie Pascual.  Please see the ED Provider Note for all HPI, PE and MDM up to the time of signout at 0200 hrs.  This is in addition to the primary record.    In brief Fredyd Olson is an 48 y.o. male presenting for herpes zoster    At the time of signout we were awaiting:  Ophthalmology recommendation    ED Course & Medical Decision Making   Medical Decision Making:  Under my care, ophthalmology recommended  - Valacyclovir 1000mg TID for 10 days  - Cool compresses to the eye twice a day  - Preservative free artificial tears q1h while awake, right eye  - Moxifloxacin qid, right eye  - Erythromycin anh qhs, right eye  - Erythromycin anh to skin lesions, twice a day     Patient left prior to prescriptions being placed in the chart.  However, prescriptions were still added and sent to patient's pharmacy.  Patient was made aware of this prior to him leaving.    ED Course:  Diagnoses as of 08/18/24 0608   Herpes zoster ophthalmicus of right eye       Disposition   Left with Treatment Incomplete    Procedures   Procedures    Patient seen and discussed with ED attending physician.    Ramin Pro, DO  Emergency Medicine

## 2024-08-19 ENCOUNTER — TELEPHONE (OUTPATIENT)
Dept: OPHTHALMOLOGY | Facility: CLINIC | Age: 49
End: 2024-08-19
Payer: COMMERCIAL

## 2024-08-20 ENCOUNTER — OFFICE VISIT (OUTPATIENT)
Dept: OPHTHALMOLOGY | Facility: CLINIC | Age: 49
End: 2024-08-20
Payer: COMMERCIAL

## 2024-08-20 DIAGNOSIS — B02.33 HERPES ZOSTER EPITHELIAL KERATITIS: Primary | ICD-10-CM

## 2024-08-20 DIAGNOSIS — B02.30 HERPES ZOSTER OPHTHALMICUS: ICD-10-CM

## 2024-08-20 PROBLEM — B00.52 HSV EPITHELIAL KERATITIS: Status: ACTIVE | Noted: 2024-08-20

## 2024-08-20 PROCEDURE — 99213 OFFICE O/P EST LOW 20 MIN: CPT | Performed by: STUDENT IN AN ORGANIZED HEALTH CARE EDUCATION/TRAINING PROGRAM

## 2024-08-20 ASSESSMENT — ENCOUNTER SYMPTOMS
ALLERGIC/IMMUNOLOGIC NEGATIVE: 0
GASTROINTESTINAL NEGATIVE: 0
NEUROLOGICAL NEGATIVE: 0
PSYCHIATRIC NEGATIVE: 0
HEMATOLOGIC/LYMPHATIC NEGATIVE: 0
ENDOCRINE NEGATIVE: 0
EYES NEGATIVE: 1
MUSCULOSKELETAL NEGATIVE: 0
CARDIOVASCULAR NEGATIVE: 0
CONSTITUTIONAL NEGATIVE: 0
RESPIRATORY NEGATIVE: 0

## 2024-08-20 ASSESSMENT — CONF VISUAL FIELD
OD_NORMAL: 1
OS_SUPERIOR_TEMPORAL_RESTRICTION: 0
OS_NORMAL: 1
OS_INFERIOR_TEMPORAL_RESTRICTION: 0
OS_INFERIOR_NASAL_RESTRICTION: 0
OD_INFERIOR_NASAL_RESTRICTION: 0
OD_INFERIOR_TEMPORAL_RESTRICTION: 0
OD_SUPERIOR_TEMPORAL_RESTRICTION: 0
OD_SUPERIOR_NASAL_RESTRICTION: 0
OS_SUPERIOR_NASAL_RESTRICTION: 0

## 2024-08-20 ASSESSMENT — VISUAL ACUITY
OD_CC: 20/25
METHOD: SNELLEN - LINEAR
OD_CC+: +2
OS_CC: 20/20

## 2024-08-20 ASSESSMENT — EXTERNAL EXAM - LEFT EYE: OS_EXAM: NORMAL

## 2024-08-20 ASSESSMENT — SLIT LAMP EXAM - LIDS: COMMENTS: NORMAL

## 2024-08-20 NOTE — PROGRESS NOTES
Assessment/Plan   Diagnoses and all orders for this visit:  Herpes zoster ophthalmicus  Herpes zoster epithelial keratitis  -patient here for 2 day f/u with diagnosis of right side herpes zoster with ophthalmic involvement  -Current TXT: Valtrex 1000mg TID PO, erythromycin to vesicles, cool compresses, PF AT's Q1-2H, Moxi QID gtt OD  -In previous exam from hospital this weekend they noted 2 dendrites and today there was only one, showing improvement with the Valtrex. Have patient continue same medication regime and in addition add PO Keflex 500mg BID for prevention of secondary bacterial pre-septal cellulitis.  -no signs of stromal or endothelial involvement, no signs of iritis    Have patient return in 1 week for HSZ F/U or sooner if symptoms worsen with dilation

## 2024-08-21 PROBLEM — B00.52 HSV EPITHELIAL KERATITIS: Status: RESOLVED | Noted: 2024-08-20 | Resolved: 2024-08-21

## 2024-08-21 PROBLEM — B02.30 HERPES ZOSTER OPHTHALMICUS: Status: ACTIVE | Noted: 2024-08-21

## 2024-08-21 PROBLEM — B02.33: Status: ACTIVE | Noted: 2024-08-21

## 2024-08-21 RX ORDER — CEPHALEXIN 500 MG/1
500 CAPSULE ORAL 2 TIMES DAILY
Qty: 20 CAPSULE | Refills: 0 | Status: SHIPPED | OUTPATIENT
Start: 2024-08-21 | End: 2024-08-31

## 2024-08-21 ASSESSMENT — TONOMETRY
OD_IOP_MMHG: 15
OS_IOP_MMHG: 13
IOP_METHOD: GOLDMANN APPLANATION

## 2024-08-21 ASSESSMENT — CUP TO DISC RATIO
OD_RATIO: .1
OS_RATIO: .1

## 2024-08-21 NOTE — PROGRESS NOTES
8/22/2024 CC: 48 y.o. presents for FU after ED visit 8/18/2024. ***    Past ocular history: Wears CL with poor hygiene (monthly, soft contact lenses, often sleeps in them), ocular migraine***  Family history: None  Past medical history: TAA, denies diabetes ***  Social history: Smoker    Eye medications:   Right eye: Moxi qid, Erythromycin anh qhs    Valacyclovir 1000mg TID for 10 days   Erythromycin anh to skin lesions, twice a day     Allergy:  ***    Testing:    None    Assessment:   Herpes zoster ophthalmicus, right eye   - 49yo male with POHx of CL (poor CL hygiene) presenting with 5-day hx of R eye pain with 1-day hx of rash of R forehead with associated headache, with no vision changes.   - Exam today of the right eye notable for temporal chemosis and 1+ conjunctival injection, tr punctate epi erosions, and two faint inferior pseudodendrites.   - There is no corneal edema, anterior chamber inflammation, or involvement of the retina.   - Overall, this patient's presentation is concerning for herpes zoster ophthalmicus of the right eye with mild ocular involvement.     Plan:   I explained my findings. ***    Eye medications:   Right eye: ***  Left eye: ***  Both eyes: ***    Return in ***

## 2024-08-22 ENCOUNTER — APPOINTMENT (OUTPATIENT)
Dept: OPHTHALMOLOGY | Facility: CLINIC | Age: 49
End: 2024-08-22
Payer: COMMERCIAL

## 2024-08-27 ENCOUNTER — APPOINTMENT (OUTPATIENT)
Dept: OPHTHALMOLOGY | Facility: CLINIC | Age: 49
End: 2024-08-27
Payer: COMMERCIAL

## 2024-08-27 DIAGNOSIS — B02.33 HERPES ZOSTER EPITHELIAL KERATITIS: Primary | ICD-10-CM

## 2024-08-27 DIAGNOSIS — B02.30 HERPES ZOSTER OPHTHALMICUS: ICD-10-CM

## 2024-08-27 PROCEDURE — 99213 OFFICE O/P EST LOW 20 MIN: CPT | Performed by: STUDENT IN AN ORGANIZED HEALTH CARE EDUCATION/TRAINING PROGRAM

## 2024-08-27 ASSESSMENT — TONOMETRY
OD_IOP_MMHG: 18
IOP_METHOD: GOLDMANN APPLANATION
OS_IOP_MMHG: 18

## 2024-08-27 ASSESSMENT — ENCOUNTER SYMPTOMS
EYES NEGATIVE: 0
PSYCHIATRIC NEGATIVE: 0
HEMATOLOGIC/LYMPHATIC NEGATIVE: 0
CONSTITUTIONAL NEGATIVE: 0
ENDOCRINE NEGATIVE: 0
NEUROLOGICAL NEGATIVE: 0
RESPIRATORY NEGATIVE: 0
CARDIOVASCULAR NEGATIVE: 0
MUSCULOSKELETAL NEGATIVE: 0
ALLERGIC/IMMUNOLOGIC NEGATIVE: 0
GASTROINTESTINAL NEGATIVE: 0

## 2024-08-27 ASSESSMENT — CUP TO DISC RATIO
OS_RATIO: .1
OD_RATIO: .1

## 2024-08-27 ASSESSMENT — SLIT LAMP EXAM - LIDS
COMMENTS: NORMAL
COMMENTS: NORMAL

## 2024-08-27 ASSESSMENT — VISUAL ACUITY
OS_CC: 20/20
METHOD: SNELLEN - LINEAR
OD_CC: 20/25
CORRECTION_TYPE: GLASSES

## 2024-08-27 ASSESSMENT — EXTERNAL EXAM - LEFT EYE: OS_EXAM: NORMAL

## 2024-08-27 NOTE — PROGRESS NOTES
Assessment/Plan   Diagnoses and all orders for this visit:  Herpes zoster ophthalmicus  Herpes zoster epithelial keratitis  -patient here for 9 day f/u with diagnosis of right side herpes zoster with ophthalmic involvement  -Current TXT: Valtrex 1000mg TID PO, erythromycin to vesicles, cool compresses, PF AT's Q1-2H, Moxi QID gtt OD, and PO Keflex 500mg BID  -on current treatment resolved corneal signs  -no ocular involvement noted on exam today  -Ed to discontinue Moxi gtt, finish PO Valtrex and Keflex dosages, continue with OTC AT's QID  -RTC for any decrease in vision, worsening pain, light sensitivity, or changes  -patient is having a dull headache and some sinus pressure right side-discussed following up with PCP to evaluate for symptoms    Otherwise RTC for annual exams prn

## 2025-06-27 ENCOUNTER — OFFICE VISIT (OUTPATIENT)
Dept: PRIMARY CARE | Facility: CLINIC | Age: 50
End: 2025-06-27
Payer: COMMERCIAL

## 2025-06-27 VITALS
WEIGHT: 260 LBS | HEART RATE: 80 BPM | BODY MASS INDEX: 32.5 KG/M2 | OXYGEN SATURATION: 97 % | RESPIRATION RATE: 18 BRPM | SYSTOLIC BLOOD PRESSURE: 118 MMHG | DIASTOLIC BLOOD PRESSURE: 78 MMHG | TEMPERATURE: 97.4 F

## 2025-06-27 DIAGNOSIS — M54.2 NECK PAIN: Primary | ICD-10-CM

## 2025-06-27 PROBLEM — R91.8 ABNORMAL RADIOLOGIC FINDING OF LUNG FIELD: Status: ACTIVE | Noted: 2019-11-04

## 2025-06-27 PROBLEM — H53.9 TRANSIENT VISION DISTURBANCE: Status: ACTIVE | Noted: 2025-06-27

## 2025-06-27 PROBLEM — R51.9 HEADACHE: Status: ACTIVE | Noted: 2025-06-27

## 2025-06-27 PROBLEM — L72.8 OTHER FOLLICULAR CYSTS OF THE SKIN AND SUBCUTANEOUS TISSUE: Status: ACTIVE | Noted: 2023-03-01

## 2025-06-27 PROBLEM — F17.210 NICOTINE DEPENDENCE, CIGARETTES, UNCOMPLICATED: Status: ACTIVE | Noted: 2021-08-24

## 2025-06-27 PROBLEM — F10.10 ETOH ABUSE: Status: ACTIVE | Noted: 2021-08-24

## 2025-06-27 PROBLEM — H53.9 VISION CHANGES: Status: ACTIVE | Noted: 2020-08-04

## 2025-06-27 PROBLEM — H53.8 BLURRED VISION: Status: ACTIVE | Noted: 2018-08-30

## 2025-06-27 PROBLEM — R11.2 NAUSEA & VOMITING: Status: ACTIVE | Noted: 2025-06-27

## 2025-06-27 PROBLEM — B07.8 COMMON WART: Status: ACTIVE | Noted: 2018-08-31

## 2025-06-27 PROBLEM — M79.645 PAIN OF LEFT THUMB: Status: ACTIVE | Noted: 2018-12-03

## 2025-06-27 PROBLEM — M79.671 RIGHT FOOT PAIN: Status: ACTIVE | Noted: 2018-12-03

## 2025-06-27 PROBLEM — E66.9 OBESITY: Status: ACTIVE | Noted: 2025-06-27

## 2025-06-27 PROBLEM — R09.81 CHRONIC NASAL CONGESTION: Status: ACTIVE | Noted: 2018-08-30

## 2025-06-27 PROBLEM — I71.21 ASCENDING AORTIC ANEURYSM: Status: ACTIVE | Noted: 2023-08-22

## 2025-06-27 PROBLEM — K12.0 APHTHOUS ULCER: Status: ACTIVE | Noted: 2020-09-24

## 2025-06-27 PROCEDURE — 99213 OFFICE O/P EST LOW 20 MIN: CPT | Performed by: FAMILY MEDICINE

## 2025-06-27 RX ORDER — PREDNISONE 20 MG/1
40 TABLET ORAL DAILY
Qty: 10 TABLET | Refills: 0 | Status: SHIPPED | OUTPATIENT
Start: 2025-06-27 | End: 2025-07-02

## 2025-06-27 ASSESSMENT — PATIENT HEALTH QUESTIONNAIRE - PHQ9
1. LITTLE INTEREST OR PLEASURE IN DOING THINGS: NOT AT ALL
2. FEELING DOWN, DEPRESSED OR HOPELESS: NOT AT ALL
SUM OF ALL RESPONSES TO PHQ9 QUESTIONS 1 AND 2: 0

## 2025-06-27 ASSESSMENT — PAIN SCALES - GENERAL: PAINLEVEL_OUTOF10: 4

## 2025-06-27 NOTE — ASSESSMENT & PLAN NOTE
Probable nerve impingement.  Will order MRI of the neck and notify him of results.  The meantime a trial of prednisone for 5 days.    Orders:    MR cervical spine wo IV contrast; Future    predniSONE (Deltasone) 20 mg tablet; Take 2 tablets (40 mg) by mouth once daily for 5 days.     43-year-old male chief complaint lower back pain.  MRI done on 4/14 which showed L4-L5 mild disc bulging with annular tear.  Patient notes over the past few months he has been getting online physical therapy.  Patient states physical therapy was making him too a lot of repetitions with low weight of squats and he believes this is most related to his pain.  Patient is able to ambulate without assistance.  Denies any fever chest pain shortness breath nausea or vomiting.  denies any loss of fecal / bladder incomitance, perirectal / groin numbness, any recent spinal procedures  Patient does not want any pain medication due to not wanting any medicine that could be ototoxic or for Tylenol Motrin does not want anything.

## 2025-06-27 NOTE — PROGRESS NOTES
Subjective   Patient ID: Asael Olson is a 49 y.o. male who presents for Neck Pain (Patient is here for neck pain shooting tingling feeling down left arm x 2 months).    HPI   2 months ago woke up after sleeping in an awkward position and has had pain in his left neck.  He has pain rating down his left arm with tingling and numbness intermittently.  No previous history of neck problems.  He has seen 2 different chiropractors and has had in some stretching.  He also saw a doctor online who gave an anti-inflammatory.  He states he had an x-ray at the chiropractor's which showed some straightening of the cervical spine.  Despite these measures his symptoms persist.  Review of Systems  Denies headache, blurred vision, chest pain, shortness of breath, nausea or vomiting, change in bowel habits or leg pain or swelling.    Objective   /78 (BP Location: Left arm, Patient Position: Sitting, BP Cuff Size: Large adult)   Pulse 80   Temp 36.3 °C (97.4 °F) (Temporal)   Resp 18   Wt 118 kg (260 lb)   SpO2 97%   BMI 32.50 kg/m²     Physical Exam  Vitals and nurse's notes reviewed  General: no acute distress  HEENT: Normal  Neck: Supple.  Equivocal Spurling test.  Lungs: Clear  Cardio: RRR w/o murmur  Extremities: Shoulders with full range of motion.  Negative spilled can sign.  Neuro: Alert and oriented with no focal deficits.  Normal hand grasp strength.    Assessment/Plan   Assessment & Plan  Neck pain  Probable nerve impingement.  Will order MRI of the neck and notify him of results.  The meantime a trial of prednisone for 5 days.    Orders:    MR cervical spine wo IV contrast; Future    predniSONE (Deltasone) 20 mg tablet; Take 2 tablets (40 mg) by mouth once daily for 5 days.

## 2025-07-10 ENCOUNTER — APPOINTMENT (OUTPATIENT)
Dept: RADIOLOGY | Facility: CLINIC | Age: 50
End: 2025-07-10
Payer: COMMERCIAL

## 2025-07-10 DIAGNOSIS — M54.2 NECK PAIN: ICD-10-CM

## 2025-07-10 PROCEDURE — 72141 MRI NECK SPINE W/O DYE: CPT
